# Patient Record
Sex: FEMALE | RURAL
[De-identification: names, ages, dates, MRNs, and addresses within clinical notes are randomized per-mention and may not be internally consistent; named-entity substitution may affect disease eponyms.]

---

## 2019-04-30 ENCOUNTER — TREATMENT (OUTPATIENT)
Dept: FAMILY MEDICINE CLINIC | Facility: CLINIC | Age: 34
End: 2019-04-30

## 2019-04-30 NOTE — PROGRESS NOTES
Pt was called on 4/29/19 about high sugars at >400 based on lab values. Also hga1c >13.0   Recommended pt start toujeo insulin 15 units at bedtime. Medication sent to pharmacy along with insulin pen needeles. Pt states she knows how to check sugars.    She also came to the office today at 4/30/19 for refill on Norco 5/325 mg eery 8 hours along with Lyrica 50 mg PO BID.  LUCI printed and on file refer number [id number].   Recent UDS was normal        This document has been electronically signed by Jarret Smith MD on April 30, 2019 10:25 AM